# Patient Record
Sex: MALE | Race: WHITE | NOT HISPANIC OR LATINO | URBAN - METROPOLITAN AREA
[De-identification: names, ages, dates, MRNs, and addresses within clinical notes are randomized per-mention and may not be internally consistent; named-entity substitution may affect disease eponyms.]

---

## 2017-02-10 ENCOUNTER — OUTPATIENT (OUTPATIENT)
Dept: OUTPATIENT SERVICES | Facility: HOSPITAL | Age: 37
LOS: 1 days | Discharge: HOME | End: 2017-02-10

## 2017-02-15 ENCOUNTER — OUTPATIENT (OUTPATIENT)
Dept: OUTPATIENT SERVICES | Facility: HOSPITAL | Age: 37
LOS: 1 days | Discharge: HOME | End: 2017-02-15

## 2017-06-27 DIAGNOSIS — Z88.1 ALLERGY STATUS TO OTHER ANTIBIOTIC AGENTS STATUS: ICD-10-CM
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
## 2017-06-27 DIAGNOSIS — S52.292A OTHER FRACTURE OF SHAFT OF LEFT ULNA, INITIAL ENCOUNTER FOR CLOSED FRACTURE: ICD-10-CM
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
## 2018-02-16 DIAGNOSIS — S52.222A DISPLACED TRANSVERSE FRACTURE OF SHAFT OF LEFT ULNA, INITIAL ENCOUNTER FOR CLOSED FRACTURE: ICD-10-CM

## 2018-02-16 DIAGNOSIS — Z01.818 ENCOUNTER FOR OTHER PREPROCEDURAL EXAMINATION: ICD-10-CM

## 2022-09-21 PROBLEM — Z00.00 ENCOUNTER FOR PREVENTIVE HEALTH EXAMINATION: Status: ACTIVE | Noted: 2022-09-21

## 2022-10-27 ENCOUNTER — APPOINTMENT (OUTPATIENT)
Dept: NEUROLOGY | Facility: CLINIC | Age: 42
End: 2022-10-27

## 2023-03-25 ENCOUNTER — NON-APPOINTMENT (OUTPATIENT)
Age: 43
End: 2023-03-25

## 2023-04-20 ENCOUNTER — APPOINTMENT (OUTPATIENT)
Dept: NEUROLOGY | Facility: CLINIC | Age: 43
End: 2023-04-20
Payer: MEDICARE

## 2023-04-20 PROCEDURE — 99213 OFFICE O/P EST LOW 20 MIN: CPT

## 2023-04-20 NOTE — ASSESSMENT
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 We will continue to prescribe his Lamictal 200 mg bid and he will undergo blood work at this time and f/u in 6 months. He is aware if there is any issue he is aware he can contact the office.\par \par I personally reviewed with the PA, this patient's history and physical exam findings, as documented above. I have discussed the relevant areas of concern, having direct implications to the presenting problems and illnesses, and I have personally examined all pertinent and positive and negative findings, which impact on the prior neurological treatment. \par \par \par Catalina Justin, MS, PA-C\par Apolinar Dodson DO\par

## 2023-04-20 NOTE — PHYSICAL EXAM
[General Appearance - Alert] : alert [General Appearance - In No Acute Distress] : in no acute distress [General Appearance - Well Nourished] : well nourished [General Appearance - Well Developed] : well developed [General Appearance - Well-Appearing] : healthy appearing [Oriented To Time, Place, And Person] : oriented to person, place, and time [Affect] : the affect was normal [Mood] : the mood was normal [Memory Remote] : remote memory was not impaired [Person] : oriented to person [Place] : oriented to place [Time] : oriented to time [Short Term Intact] : short term memory intact [Remote Intact] : remote memory intact [Registration Intact] : recent registration memory intact [Cranial Nerves Optic (II)] : visual acuity intact bilaterally,  visual fields full to confrontation, pupils equal round and reactive to light [Cranial Nerves Oculomotor (III)] : extraocular motion intact [Cranial Nerves Facial (VII)] : face symmetrical [Cranial Nerves Accessory (XI - Cranial And Spinal)] : head turning and shoulder shrug symmetric [Motor Tone] : muscle tone was normal in all four extremities [Motor Strength] : muscle strength was normal in all four extremities [Involuntary Movements] : no involuntary movements were seen

## 2023-04-20 NOTE — HISTORY OF PRESENT ILLNESS
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 His Presentation initially was said to be very typical and a puzzle many doctors, and various diagnoses were given including dystonia, paralysis. He also had tried multiple medications including ativan for over five years time without much improvement and may have made his cognitive function worse. It wasn’t until he was started on the metal, his symptoms improved drastically. He lawn longer gets these episodes of stiffness and paralysis, and his anxiety/depression have also been under control. He still get these episodes of staring multiple times a day, and it’s unclear whether these episodes of seizures. He has never Had any Eeg after starting the lamictal to see if these episodes are still seizures.. In the past he also had the diagnosis of x-ray to sleep apnea but since he had surgery, his symptoms have disappeared even though he has never had any repeat study to see if there is any residual sleep apnea. \par \par He has done cognitive therapy in the past which he did not find helpful. \par \par He did have a neurologist for whom he has seen for the last three years in the city, however because the doctor is hard to reach and it’s far from where you live, he would like to transfer the care here.\par  Follow Up Visit:\par History of Present Illness: I had the pleasure of seeing Mr. Oswald today in follow up. His previous history and physical findings have been reviewed.\par \par He is under our care for seizures secondary to TBI which is a chronic condition he is receiving continuing active treatment for. He states nothing has changed over the past several months with respect to his condition. His continues to remain seizure free on Lamictal 200 mg BID without any side effects. He states this has been the only medication to aid in his symptoms. We will have him undergo updated blood work and continue as is without change.

## 2023-10-19 ENCOUNTER — APPOINTMENT (OUTPATIENT)
Dept: NEUROLOGY | Facility: CLINIC | Age: 43
End: 2023-10-19

## 2023-12-22 ENCOUNTER — NON-APPOINTMENT (OUTPATIENT)
Age: 43
End: 2023-12-22

## 2024-03-19 DIAGNOSIS — E78.00 PURE HYPERCHOLESTEROLEMIA, UNSPECIFIED: ICD-10-CM

## 2024-04-11 ENCOUNTER — APPOINTMENT (OUTPATIENT)
Dept: NEUROLOGY | Facility: CLINIC | Age: 44
End: 2024-04-11
Payer: COMMERCIAL

## 2024-04-11 DIAGNOSIS — R56.9 UNSPECIFIED CONVULSIONS: ICD-10-CM

## 2024-04-11 PROCEDURE — 99213 OFFICE O/P EST LOW 20 MIN: CPT

## 2024-04-11 RX ORDER — LAMOTRIGINE 200 MG/1
200 TABLET ORAL TWICE DAILY
Qty: 60 | Refills: 11 | Status: ACTIVE | COMMUNITY
Start: 2022-10-27 | End: 1900-01-01

## 2024-04-11 NOTE — ASSESSMENT
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 I will continue to prescribe his Lamictal 200 mg bid and he will f/u in 1 year. He is aware if there is any issue he is aware he can contact the office.  Catalina Justin MS, PA-C Apolinar Dodson DO, Supervising Physician

## 2024-04-11 NOTE — HISTORY OF PRESENT ILLNESS
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 His Presentation initially was said to be very typical and a puzzle many doctors, and various diagnoses were given including dystonia, paralysis. He also had tried multiple medications including ativan for over five years time without much improvement and may have made his cognitive function worse. It wasn't until he was started on the metal, his symptoms improved drastically. He lawn longer gets these episodes of stiffness and paralysis, and his anxiety/depression have also been under control. He still get these episodes of staring multiple times a day, and it's unclear whether these episodes of seizures. He has never Had any Eeg after starting the lamictal to see if these episodes are still seizures.. In the past he also had the diagnosis of x-ray to sleep apnea but since he had surgery, his symptoms have disappeared even though he has never had any repeat study to see if there is any residual sleep apnea.   He has done cognitive therapy in the past which he did not find helpful.   He did have a neurologist for whom he has seen for the last three years in the city, however because the doctor is hard to reach and it's far from where you live, he would like to transfer the care here.    TODAY:   I had the pleasure of seeing Mr. Oswald accompanied by his fiance today in follow up. His previous history and physical findings have been reviewed.  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 He continues to remain seizure free on Lamictal 200 mg BID without any side effects. He states this has been the only medication to aid in his symptoms.  He underwent updated blood work this week but the results are not ready yet.  We will therefore continue as is without change.

## 2025-01-08 DIAGNOSIS — M54.2 CERVICALGIA: ICD-10-CM

## 2025-01-08 DIAGNOSIS — M54.9 DORSALGIA, UNSPECIFIED: ICD-10-CM

## 2025-03-25 ENCOUNTER — APPOINTMENT (OUTPATIENT)
Dept: NEUROLOGY | Facility: CLINIC | Age: 45
End: 2025-03-25

## 2025-04-01 ENCOUNTER — APPOINTMENT (OUTPATIENT)
Dept: NEUROLOGY | Facility: CLINIC | Age: 45
End: 2025-04-01
Payer: COMMERCIAL

## 2025-04-01 VITALS
DIASTOLIC BLOOD PRESSURE: 85 MMHG | SYSTOLIC BLOOD PRESSURE: 146 MMHG | WEIGHT: 275 LBS | HEIGHT: 66 IN | HEART RATE: 84 BPM | BODY MASS INDEX: 44.2 KG/M2

## 2025-04-01 DIAGNOSIS — R56.9 UNSPECIFIED CONVULSIONS: ICD-10-CM

## 2025-04-01 PROCEDURE — 99214 OFFICE O/P EST MOD 30 MIN: CPT

## 2025-04-22 ENCOUNTER — APPOINTMENT (OUTPATIENT)
Dept: NEUROLOGY | Facility: CLINIC | Age: 45
End: 2025-04-22

## 2025-05-20 ENCOUNTER — APPOINTMENT (OUTPATIENT)
Dept: NEUROLOGY | Facility: CLINIC | Age: 45
End: 2025-05-20

## 2025-05-20 ENCOUNTER — APPOINTMENT (OUTPATIENT)
Dept: NEUROLOGY | Facility: CLINIC | Age: 45
End: 2025-05-20
Payer: COMMERCIAL

## 2025-05-20 VITALS
HEIGHT: 66 IN | OXYGEN SATURATION: 99 % | DIASTOLIC BLOOD PRESSURE: 80 MMHG | SYSTOLIC BLOOD PRESSURE: 139 MMHG | RESPIRATION RATE: 20 BRPM | HEART RATE: 92 BPM | WEIGHT: 278.13 LBS | BODY MASS INDEX: 44.7 KG/M2

## 2025-05-20 DIAGNOSIS — R56.9 UNSPECIFIED CONVULSIONS: ICD-10-CM

## 2025-05-20 PROCEDURE — 99204 OFFICE O/P NEW MOD 45 MIN: CPT

## 2025-05-23 RX ORDER — PSYLLIUM HUSK 0.4 G
CAPSULE ORAL
Refills: 0 | Status: ACTIVE | COMMUNITY

## 2025-05-23 RX ORDER — CHROMIUM 200 MCG
TABLET ORAL
Refills: 0 | Status: ACTIVE | COMMUNITY

## 2025-05-23 RX ORDER — ASCORBIC ACID 500 MG
TABLET ORAL DAILY
Refills: 0 | Status: ACTIVE | COMMUNITY

## 2025-05-27 ENCOUNTER — APPOINTMENT (OUTPATIENT)
Dept: NEUROLOGY | Facility: CLINIC | Age: 45
End: 2025-05-27
Payer: COMMERCIAL

## 2025-05-27 PROCEDURE — 95816 EEG AWAKE AND DROWSY: CPT

## 2025-06-09 ENCOUNTER — APPOINTMENT (OUTPATIENT)
Dept: NEUROLOGY | Facility: CLINIC | Age: 45
End: 2025-06-09
Payer: COMMERCIAL

## 2025-06-09 PROCEDURE — 95708 EEG WO VID EA 12-26HR UNMNTR: CPT

## 2025-06-09 PROCEDURE — 95721 EEG PHY/QHP>36<60 HR W/O VID: CPT

## 2025-06-10 PROCEDURE — 95708 EEG WO VID EA 12-26HR UNMNTR: CPT

## 2025-06-11 ENCOUNTER — APPOINTMENT (OUTPATIENT)
Dept: NEUROLOGY | Facility: CLINIC | Age: 45
End: 2025-06-11

## 2025-06-11 PROCEDURE — 95700 EEG CONT REC W/VID EEG TECH: CPT

## 2025-06-17 ENCOUNTER — NON-APPOINTMENT (OUTPATIENT)
Age: 45
End: 2025-06-17

## 2025-06-25 ENCOUNTER — APPOINTMENT (OUTPATIENT)
Dept: NEUROPSYCHOLOGY | Facility: CLINIC | Age: 45
End: 2025-06-25